# Patient Record
Sex: MALE | Race: OTHER | NOT HISPANIC OR LATINO
[De-identification: names, ages, dates, MRNs, and addresses within clinical notes are randomized per-mention and may not be internally consistent; named-entity substitution may affect disease eponyms.]

---

## 2021-11-23 PROBLEM — Z00.00 ENCOUNTER FOR PREVENTIVE HEALTH EXAMINATION: Status: ACTIVE | Noted: 2021-11-23

## 2021-11-29 ENCOUNTER — APPOINTMENT (OUTPATIENT)
Dept: OTOLARYNGOLOGY | Facility: CLINIC | Age: 36
End: 2021-11-29
Payer: MEDICAID

## 2021-11-29 VITALS
HEIGHT: 73 IN | HEART RATE: 64 BPM | DIASTOLIC BLOOD PRESSURE: 67 MMHG | OXYGEN SATURATION: 98 % | WEIGHT: 250 LBS | BODY MASS INDEX: 33.13 KG/M2 | SYSTOLIC BLOOD PRESSURE: 106 MMHG | TEMPERATURE: 97.6 F

## 2021-11-29 PROCEDURE — 31579 LARYNGOSCOPY TELESCOPIC: CPT

## 2021-11-29 PROCEDURE — 99204 OFFICE O/P NEW MOD 45 MIN: CPT | Mod: 25

## 2021-11-29 NOTE — REASON FOR VISIT
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

## 2021-11-29 NOTE — HISTORY OF PRESENT ILLNESS
[de-identified] : 37 yo male who presents with dysphonia.  Symptoms have been present for several years.  He has hoarseness and raspiness to his voice.  Decreased range as well.  Normal projection and cough.  No issues chewing, eating or swallowing.  No breathing issues.  No ENT issues otherwise.  Seen by Dr. 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

## 2021-11-29 NOTE — PROCEDURE
[de-identified] : -\par Procedure Note\par \par Pre-operative Diagnosis: dysphonia\par Post-operative Diagnosis: left vocal fold lesion\par Anesthesia: Topical - 1 % Lidocaine/Phenylephrine\par Procedure:  Flexible Laryngoscopy with Stroboscopy\par  \par Procedure Details:  \par The patient was placed in the sitting position.  After decongestant and anesthesia were applied the laryngoscope was passed.  The nasal cavities, nasopharynx, oropharynx, hypopharynx, and larynx were all examined.  Vocal folds were examined during respiration and phonation.  The following findings were noted:\par \par Findings:  \par Nose: Septum is midline, turbinates are normal, nasal airways patent, mucosa normal\par Nasopharynx: Adenoids normal, no masses, eustachian tube normal\par Oropharynx: Pharyngeal walls symmetric and without lesion. Tonsils/fossae symmetric\par Hypopharynx: Hypopharynx and pyriform sinuses without lesion. No masses or asymmetry.  No pooling of secretions.\par Larynx:  Epiglottis and aryepiglottic folds were sharp and crisp bilaterally.  Bilateral false vocal folds normal appearance. Airway was widely patent.\par \par Strobe Exam Ratings\par 		\par TVF Appearance: left vocal fold lesion, polypoid in nature, ant 1/3\par TVF Mobility: normal\par Edema/hypertrophy: mild\par Mucus on TVF: normal\par Glottic Closure: +from lesion \par Mucosal Wave: reduced bilaterally\par Amplitude of Vibration: reduced\par Phase: asymmetric\par Supraglottic Hyperfunction: mild\par Other Findings: none\par \par Condition: Stable.  Patient tolerated procedure well.\par \par Complications: None\par

## 2021-11-29 NOTE — PHYSICAL EXAM
[Midline] : trachea located in midline position [Normal] : no rashes [FreeTextEntry1] : hoarse and raspy voice

## 2021-11-29 NOTE — ASSESSMENT
[FreeTextEntry1] : 35 yo male who presents with dysphonia.  On exam there is evidence of a left vocal fold polyp.  We discussed options including, observation, speech therapy and surgical removal.  I am advising surgery at this time.  Pt is a candidate and would benefit from suspension microdirect laryngoscopy with excision of vocal fold lesion.  Risk, benefits and alternatives of surgery were discussed including bleeding, infection, pain, risk of anesthesia, problems chewing or swallowing, changed or worsened voice, chipped teeth, tongue numbness, taste disturbance, referred ear pain, need for further procedures and possible time off of work.\par \par - suspension microdirect laryngoscopy with excision of left vocal fold lesion and microflap reconstruction\par - pre op clearance\par - COVID testing\par - pre op visit. \par

## 2022-04-11 ENCOUNTER — APPOINTMENT (OUTPATIENT)
Dept: OTOLARYNGOLOGY | Facility: CLINIC | Age: 37
End: 2022-04-11
Payer: MEDICAID

## 2022-04-11 VITALS
WEIGHT: 255 LBS | BODY MASS INDEX: 33.8 KG/M2 | DIASTOLIC BLOOD PRESSURE: 59 MMHG | SYSTOLIC BLOOD PRESSURE: 94 MMHG | HEART RATE: 61 BPM | HEIGHT: 73 IN | TEMPERATURE: 97.6 F

## 2022-04-11 PROCEDURE — 99214 OFFICE O/P EST MOD 30 MIN: CPT | Mod: 25

## 2022-04-11 PROCEDURE — 31579 LARYNGOSCOPY TELESCOPIC: CPT

## 2022-04-11 NOTE — PROCEDURE
[de-identified] : -\par Procedure Note\par \par Pre-operative Diagnosis: dysphonia\par Post-operative Diagnosis: left vocal fold lesion\par Anesthesia: Topical - 1 % Lidocaine/Phenylephrine\par Procedure: Flexible Laryngoscopy with Stroboscopy\par  \par Procedure Details: \par The patient was placed in the sitting position. After decongestant and anesthesia were applied the laryngoscope was passed. The nasal cavities, nasopharynx, oropharynx, hypopharynx, and larynx were all examined. Vocal folds were examined during respiration and phonation. The following findings were noted:\par \par Findings: \par Nose: Septum is midline, turbinates are normal, nasal airways patent, mucosa normal\par Nasopharynx: Adenoids normal, no masses, eustachian tube normal\par Oropharynx: Pharyngeal walls symmetric and without lesion. Tonsils/fossae symmetric\par Hypopharynx: Hypopharynx and pyriform sinuses without lesion. No masses or asymmetry. No pooling of secretions.\par Larynx: Epiglottis and aryepiglottic folds were sharp and crisp bilaterally. Bilateral false vocal folds normal appearance. Airway was widely patent.\par \par Strobe Exam Ratings\par 		\par TVF Appearance: left vocal fold lesion, polypoid in nature, ant 1/3\par TVF Mobility: normal\par Edema/hypertrophy: mild\par Mucus on TVF: normal\par Glottic Closure: +from lesion \par Mucosal Wave: reduced bilaterally\par Amplitude of Vibration: reduced\par Phase: asymmetric\par Supraglottic Hyperfunction: mild\par Other Findings: none\par \par Condition: Stable. Patient tolerated procedure well.\par \par Complications: None\par

## 2022-04-11 NOTE — HISTORY OF PRESENT ILLNESS
[de-identified] : November 29, 2021\par 35 yo male who presents with dysphonia.  Symptoms have been present for several years.  He has hoarseness and raspiness to his voice.  Decreased range as well.  Normal projection and cough.  No issues chewing, eating or swallowing.  No breathing issues.  No ENT issues otherwise.  Seen by Dr. 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  He denies any change in symptoms.  He denies any new ear, nose, throat issues.  Today he presents for his preoperative evaluation.

## 2022-04-11 NOTE — PHYSICAL EXAM
[FreeTextEntry1] : hoarse and raspy voice [Midline] : trachea located in midline position [Normal] : no rashes

## 2022-04-11 NOTE — ASSESSMENT
[FreeTextEntry1] : 37 yo male who presents with dysphonia.  On exam there is evidence of a left vocal fold polyp.  We discussed options including, observation, speech therapy and surgical removal.  I am again advised surgery at this time.  Pt is a candidate and would benefit from suspension microdirect laryngoscopy with excision of vocal fold lesion.  Risk, benefits and alternatives of surgery were again discussed including bleeding, infection, pain, risk of anesthesia, problems chewing or swallowing, changed or worsened voice, chipped teeth, tongue numbness, taste disturbance, referred ear pain, need for further procedures and possible time off of work.\par \par - suspension microdirect laryngoscopy with excision of left vocal fold lesion and microflap reconstruction\par - pre op clearance\par - COVID testing\par - pre op visit. \par

## 2022-04-18 ENCOUNTER — LABORATORY RESULT (OUTPATIENT)
Age: 37
End: 2022-04-18

## 2022-04-19 ENCOUNTER — TRANSCRIPTION ENCOUNTER (OUTPATIENT)
Age: 37
End: 2022-04-19

## 2022-04-19 NOTE — ASU PATIENT PROFILE, ADULT - FALL HARM RISK - UNIVERSAL INTERVENTIONS
Bed in lowest position, wheels locked, appropriate side rails in place/Call bell, personal items and telephone in reach/Instruct patient to call for assistance before getting out of bed or chair/Non-slip footwear when patient is out of bed/[place] to call system/Physically safe environment - no spills, clutter or unnecessary equipment/Purposeful Proactive Rounding/Room/bathroom lighting operational, light cord in reach

## 2022-04-20 ENCOUNTER — APPOINTMENT (OUTPATIENT)
Dept: OTOLARYNGOLOGY | Facility: AMBULATORY SURGERY CENTER | Age: 37
End: 2022-04-20

## 2022-04-20 ENCOUNTER — TRANSCRIPTION ENCOUNTER (OUTPATIENT)
Age: 37
End: 2022-04-20

## 2022-04-20 ENCOUNTER — RESULT REVIEW (OUTPATIENT)
Age: 37
End: 2022-04-20

## 2022-04-20 ENCOUNTER — OUTPATIENT (OUTPATIENT)
Dept: OUTPATIENT SERVICES | Facility: HOSPITAL | Age: 37
LOS: 1 days | Discharge: HOME | End: 2022-04-20
Payer: MEDICAID

## 2022-04-20 VITALS
HEART RATE: 50 BPM | OXYGEN SATURATION: 100 % | RESPIRATION RATE: 17 BRPM | DIASTOLIC BLOOD PRESSURE: 64 MMHG | SYSTOLIC BLOOD PRESSURE: 106 MMHG

## 2022-04-20 VITALS
HEIGHT: 73 IN | OXYGEN SATURATION: 99 % | HEART RATE: 58 BPM | WEIGHT: 253.97 LBS | TEMPERATURE: 98 F | RESPIRATION RATE: 16 BRPM | SYSTOLIC BLOOD PRESSURE: 96 MMHG | DIASTOLIC BLOOD PRESSURE: 54 MMHG

## 2022-04-20 DIAGNOSIS — I83.811 VARICOSE VEINS OF RIGHT LOWER EXTREMITY WITH PAIN: Chronic | ICD-10-CM

## 2022-04-20 PROCEDURE — 31545 REMOVE VC LESION W/SCOPE: CPT

## 2022-04-20 PROCEDURE — 88305 TISSUE EXAM BY PATHOLOGIST: CPT | Mod: 26

## 2022-04-20 DEVICE — GEL PROLARYN 1 CC SYR W TRANS ORAL NDL
Type: IMPLANTABLE DEVICE | Site: VOCAL CORD , LEFT | Status: NON-FUNCTIONAL
Removed: 2022-04-20

## 2022-04-20 DEVICE — STONE EXTRACTOR NGAGE NITINOL 1.7FR 8MM
Type: IMPLANTABLE DEVICE | Site: VOCAL CORD , LEFT | Status: NON-FUNCTIONAL
Removed: 2022-04-20

## 2022-04-20 DEVICE — TUBE TRACH SZ 6 UNCUFF FLEX REUSE
Type: IMPLANTABLE DEVICE | Site: VOCAL CORD , LEFT | Status: NON-FUNCTIONAL
Removed: 2022-04-20

## 2022-04-20 RX ORDER — SODIUM CHLORIDE 9 MG/ML
1000 INJECTION, SOLUTION INTRAVENOUS
Refills: 0 | Status: DISCONTINUED | OUTPATIENT
Start: 2022-04-20 | End: 2022-04-20

## 2022-04-20 RX ORDER — FENTANYL CITRATE 50 UG/ML
25 INJECTION INTRAVENOUS
Refills: 0 | Status: DISCONTINUED | OUTPATIENT
Start: 2022-04-20 | End: 2022-04-20

## 2022-04-20 RX ORDER — ACETAMINOPHEN 500 MG
1000 TABLET ORAL ONCE
Refills: 0 | Status: DISCONTINUED | OUTPATIENT
Start: 2022-04-20 | End: 2022-04-20

## 2022-04-20 RX ORDER — GLUTAMINE 5 G/1
0 POWDER, FOR SOLUTION ORAL
Qty: 0 | Refills: 0 | DISCHARGE

## 2022-04-20 RX ORDER — METHYLPREDNISOLONE 4 MG/1
4 TABLET ORAL
Qty: 1 | Refills: 0 | Status: ACTIVE | COMMUNITY
Start: 2022-04-20 | End: 1900-01-01

## 2022-04-20 RX ORDER — OMEGA-3 ACID ETHYL ESTERS 1 G
0 CAPSULE ORAL
Qty: 0 | Refills: 0 | DISCHARGE

## 2022-04-20 RX ORDER — L.ACIDOPH/B.ANIMALIS/B.LONGUM 15B CELL
1 CAPSULE ORAL
Qty: 0 | Refills: 0 | DISCHARGE

## 2022-04-20 RX ORDER — HYDROCODONE BITARTRATE AND ACETAMINOPHEN 5; 325 MG/1; MG/1
5-325 TABLET ORAL
Qty: 10 | Refills: 0 | Status: ACTIVE | COMMUNITY
Start: 2022-04-20 | End: 1900-01-01

## 2022-04-20 RX ORDER — OMEPRAZOLE 20 MG/1
20 CAPSULE, DELAYED RELEASE ORAL
Qty: 30 | Refills: 0 | Status: ACTIVE | COMMUNITY
Start: 2022-04-20 | End: 1900-01-01

## 2022-04-20 NOTE — ASU DISCHARGE PLAN (ADULT/PEDIATRIC) - ASU DC SPECIAL INSTRUCTIONSFT
- Follow-up with Dr. Gomez in 1 week as instructed  - Take pain medications as prescribed  - Take medications as prescribed

## 2022-04-20 NOTE — BRIEF OPERATIVE NOTE - NSICDXBRIEFPROCEDURE_GEN_ALL_CORE_FT
PROCEDURES:  Laryngoscopy, direct, microscopic 20-Apr-2022 12:05:42  Tomas Ponce  Direct laryngoscopy with operating microscope with submucosal removal of non-neoplastic lesion of vocal cord with reconstruction with local tissue flap 20-Apr-2022 12:08:07  Tomas Ponce

## 2022-04-20 NOTE — ASU DISCHARGE PLAN (ADULT/PEDIATRIC) - CARE PROVIDER_API CALL
Ciro Gomez)  Otolaryngology  [address], [address], [place] [address]  Phone: [phone]  Fax: [phone]  Established Patient  Follow Up Time:

## 2022-04-20 NOTE — ASU DISCHARGE PLAN (ADULT/PEDIATRIC) - NS MD DC FALL RISK RISK
For information on Fall & Injury Prevention, visit: [email or web address] OR  [email or web address] OR  https://www.cdc.gov/steadi/patient.html

## 2022-04-22 LAB — SURGICAL PATHOLOGY STUDY: SIGNIFICANT CHANGE UP

## 2022-04-28 ENCOUNTER — APPOINTMENT (OUTPATIENT)
Dept: OTOLARYNGOLOGY | Facility: CLINIC | Age: 37
End: 2022-04-28
Payer: MEDICAID

## 2022-04-28 VITALS
DIASTOLIC BLOOD PRESSURE: 70 MMHG | BODY MASS INDEX: 33.8 KG/M2 | TEMPERATURE: 97.7 F | HEIGHT: 73 IN | SYSTOLIC BLOOD PRESSURE: 110 MMHG | HEART RATE: 68 BPM | OXYGEN SATURATION: 98 % | WEIGHT: 255 LBS

## 2022-04-28 PROCEDURE — 99024 POSTOP FOLLOW-UP VISIT: CPT

## 2022-04-28 PROCEDURE — 31579 LARYNGOSCOPY TELESCOPIC: CPT | Mod: 58

## 2022-04-28 NOTE — PROCEDURE
[de-identified] : -\par Procedure Note\par \par Pre-operative Diagnosis:   Postop\par Post-operative Diagnosis:  normal exam, little evidence of postsurgical change on the left vocal fold\par Anesthesia: Topical - 1 % Lidocaine/Phenylephrine\par Procedure:  Flexible Laryngoscopy with Stroboscopy\par  \par Procedure Details:  \par The patient was placed in the sitting position.  After decongestant and anesthesia were applied the laryngoscope was passed.  The nasal cavities, nasopharynx, oropharynx, hypopharynx, and larynx were all examined.  Vocal folds were examined during respiration and phonation.  The following findings were noted:\par \par Findings:  \par Nose: Septum is midline, turbinates are normal, nasal airways patent, mucosa normal\par Nasopharynx: Adenoids normal, no masses, eustachian tube normal\par Oropharynx: Pharyngeal walls symmetric and without lesion. Tonsils/fossae symmetric\par Hypopharynx: Hypopharynx and pyriform sinuses without lesion. No masses or asymmetry.  No pooling of secretions.\par Larynx:  Epiglottis and aryepiglottic folds were sharp and crisp bilaterally.  Bilateral false vocal folds normal appearance. Airway was widely patent.\par \par Strobe Exam Ratings\par 		\par TVF Appearance:  normal\par TVF Mobility:  normal\par Edema/hypertrophy:  normal\par Mucus on TVF:  normal\par Glottic Closure:  adequate and complete\par Mucosal Wave:  normal\par Amplitude of Vibration:  normal\par Phase:  symmetric\par Supraglottic Hyperfunction:  minimal\par Other Findings:  none\par \par Condition: Stable.  Patient tolerated procedure well.\par \par Complications: None\par

## 2022-04-28 NOTE — HISTORY OF PRESENT ILLNESS
[de-identified] : November 29, 2021\par 37 yo male who presents with dysphonia.  Symptoms have been present for several years.  He has hoarseness and raspiness to his voice.  Decreased range as well.  Normal projection and cough.  No issues chewing, eating or swallowing.  No breathing issues.  No ENT issues otherwise.  Seen by Dr. 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  He denies any change in symptoms.  He denies any new ear, nose, throat issues.  Today he presents for his preoperative evaluation.\par - [FreeTextEntry1] :  update 4/28/2022\par POD #8 status post suspension MicroDirect laryngoscopy with excision of left vocal fold lesion.  Patient is without pain.  No issues chewing, eating, or swallowing.  He already notes a significant improvement in terms of his voice.   he notes significant improvement in terms of projection.  No new ear, nose, throat symptoms otherwise.

## 2022-04-28 NOTE — ASSESSMENT
[FreeTextEntry1] : 37 yo male who presents with dysphonia.  On exam there is evidence of a left vocal fold polyp.  \par \par Patient is now POD #8 status post suspension MicroDirect laryngoscopy with excision of left-sided vocal fold lesion.    Pathology is consistent with laryngeal polyp.  Overall the patient is stable and doing well and already notes significant improvement in terms of symptomatology.     He can now return back to normal voice use, however I recommended continued voice hygiene.  At the patient's request I am recommending consultation with speech pathology to help with postoperative changes as well as help ensure that this will not recur.    Patient will otherwise follow-up with me in 5 weeks time, sooner should symptoms worsen or fail to improve.\par \par -   Voice hygiene\par -  consultation with speech pathology\par -  follow-up with me in 5 weeks, sooner should symptoms worsen or fail to improve\par \par

## 2022-04-28 NOTE — PHYSICAL EXAM
[Normal] : normal appearance, well groomed, well nourished, and in no acute distress [FreeTextEntry1] :   Mild raspiness but overall very clear and significantly improved.  Normal range and projection

## 2022-06-09 ENCOUNTER — APPOINTMENT (OUTPATIENT)
Dept: OTOLARYNGOLOGY | Facility: CLINIC | Age: 37
End: 2022-06-09
Payer: MEDICAID

## 2022-06-09 VITALS
HEART RATE: 69 BPM | SYSTOLIC BLOOD PRESSURE: 101 MMHG | WEIGHT: 250 LBS | TEMPERATURE: 97.6 F | HEIGHT: 73 IN | BODY MASS INDEX: 33.13 KG/M2 | DIASTOLIC BLOOD PRESSURE: 64 MMHG

## 2022-06-09 DIAGNOSIS — R49.0 DYSPHONIA: ICD-10-CM

## 2022-06-09 DIAGNOSIS — J38.3 OTHER DISEASES OF VOCAL CORDS: ICD-10-CM

## 2022-06-09 PROCEDURE — 99214 OFFICE O/P EST MOD 30 MIN: CPT | Mod: 25

## 2022-06-09 PROCEDURE — 31579 LARYNGOSCOPY TELESCOPIC: CPT

## 2022-06-10 NOTE — PROCEDURE
[de-identified] : Procedure Note\par \par Pre-operative Diagnosis: Postop\par Post-operative Diagnosis: normal exam, little evidence of postsurgical change on the left vocal fold\par Anesthesia: Topical - 1 % Lidocaine/Phenylephrine\par Procedure: Flexible Laryngoscopy with Stroboscopy\par  \par Procedure Details: \par The patient was placed in the sitting position. After decongestant and anesthesia were applied the laryngoscope was passed. The nasal cavities, nasopharynx, oropharynx, hypopharynx, and larynx were all examined. Vocal folds were examined during respiration and phonation. The following findings were noted:\par \par Findings: \par Nose: Septum is midline, turbinates are normal, nasal airways patent, mucosa normal\par Nasopharynx: Adenoids normal, no masses, eustachian tube normal\par Oropharynx: Pharyngeal walls symmetric and without lesion. Tonsils/fossae symmetric\par Hypopharynx: Hypopharynx and pyriform sinuses without lesion. No masses or asymmetry. No pooling of secretions.\par Larynx: Epiglottis and aryepiglottic folds were sharp and crisp bilaterally. Bilateral false vocal folds normal appearance. Airway was widely patent.\par \par Strobe Exam Ratings\par 		\par TVF Appearance: normal\par TVF Mobility: normal\par Edema/hypertrophy: normal\par Mucus on TVF: normal\par Glottic Closure: adequate and complete\par Mucosal Wave: normal\par Amplitude of Vibration: normal\par Phase: symmetric\par Supraglottic Hyperfunction: minimal\par Other Findings: none\par \par Condition: Stable. Patient tolerated procedure well.\par \par Complications: None\par .

## 2022-06-10 NOTE — HISTORY OF PRESENT ILLNESS
[de-identified] : November 29, 2021\par 35 yo male who presents with dysphonia. Symptoms have been present for several years. He has hoarseness and raspiness to his voice. Decreased range as well. Normal projection and cough. No issues chewing, eating or swallowing. No breathing issues. No ENT issues otherwise. Seen by Dr. 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 He denies any change in symptoms. He denies any new ear, nose, throat issues. Today he presents for his preoperative evaluation.\par - \par update 4/28/2022\par POD #8 status post suspension MicroDirect laryngoscopy with excision of left vocal fold lesion. Patient is without pain. No issues chewing, eating, or swallowing. He already notes a significant improvement in terms of his voice. he notes significant improvement in terms of projection. No new ear, nose, throat symptoms otherwise. \par - [FreeTextEntry1] : Update 6/9/22\par Post op week 6 status post suspension MicroDirect laryngoscopy with excision of left vocal fold lesion. Patient is without pain. No issues chewing, eating, or swallowing. He already notes a significant improvement in terms of his voice and how he projects his voice. He notes that his voice has been stable since last visit. No new ear, nose, throat symptoms otherwise.

## 2022-06-10 NOTE — PHYSICAL EXAM
[Normal] : normal appearance, well groomed, well nourished, and in no acute distress [FreeTextEntry1] : Mild raspiness (at his long tme baseline) but overall very clear and significantly improved since pre op but stable. Normal range and projection. \par

## 2022-06-10 NOTE — ASSESSMENT
[FreeTextEntry1] : 35 yo male who presents with dysphonia. On exam there is evidence of a left vocal fold polyp. \par \par Patient is now 1 month status post suspension MicroDirect laryngoscopy with excision of left-sided vocal fold lesion. Pathology is consistent with laryngeal polyp. Overall the patient is stable and doing well and already notes significant improvement in terms of symptomatology. He can now return back to normal voice use, however I recommended continued voice hygiene. At the patient's request I am recommending consultation with speech pathology to help with postoperative changes as well as help ensure that this will not recur. Patient to follow up as needed at this point. \par \par - Voice hygiene\par - consultation with speech pathology\par - follow-up with me as needed sooner should symptoms worsen or fail to improve

## 2022-06-12 ENCOUNTER — NON-APPOINTMENT (OUTPATIENT)
Age: 37
End: 2022-06-12

## 2022-06-23 ENCOUNTER — APPOINTMENT (OUTPATIENT)
Dept: OTOLARYNGOLOGY | Facility: CLINIC | Age: 37
End: 2022-06-23
